# Patient Record
(demographics unavailable — no encounter records)

---

## 2025-02-03 NOTE — PHYSICAL EXAM
[de-identified] : General: Patient is a well-appearing female in no apparent distress. She is alert and oriented x 3. Vital signs are within normal limits.  No sign of fevers or infectious symptoms.   Hygiene: Excellent HEENT: Atraumatic with no asymmetry.  Neck motion is normal. No overt hearing deficits. Pulmonary: Breathing comfortably. Gastrointestinal: Is/is not obese.   Psych: Responding appropriately with appropriate affect. Patient does not demonstrate tangential thought, perseveration or anxiety. Vascular: No rashes or obvious skin abnormalities in either lower extremity. Good distal perfusion. Neurovascular: Varicose veins present/absent. 5/5 strength with hip flexion, knee extension, ankle dorsiflexion, ankle plantar flexion, and EHL. Sensation is intact over the lower extremity in L2-S1 nerve distributions. 2+ dorsalis pedis and posterior tibial pulses   [de-identified] : Gait: She walks with a Antalgic gait on the right side but no Trendelenburg   Inspection: Hip appears unremarkable. No lymphedema observed. No pitting edema observed. No ulcerations observed   Palpation: No tenderness to palpation of the greater troch   Range of Motion:   Right: HF: 110 IR: 5 ER: 25   Left: HF: 115 IR: 10 ER: 35 Both hips are stable no sign of dislocation or subluxation on exam   Knee exam is normal bilaterally. No effusions. Good pain free full ROM bilaterally, both knees are stable to varus/valgus and ant/post stress  [de-identified] : Reviewed multiple hip x-rays taken previously.  There is severe arthritis of the right hip with bone-on-bone apposition sclerosis cyst formation osteophyte formation and deformation of the femoral head.

## 2025-02-03 NOTE — CONSULT LETTER
[FreeTextEntry2] : Dr. Jacinto [FreeTextEntry1] : I had the privilege of evaluating your patient today. I have enclosed my office note for your reference. If you have any questions, concerns or further input, please do not hesitate to contact me.  Thank you for allowing me to participate in the care of your patient.  Sincerely, Jamie Celis MD

## 2025-02-03 NOTE — HISTORY OF PRESENT ILLNESS
[de-identified] : ZEFERINO BRUNNER is a pleasant 65 year female . ZEFERINO BRUNNER complains of right hip pain over the past 2 years. Pain was initially located in the groin but is now mostly in the right buttock. She denies prior injury or trauma but notes the hip pain began after she had to rely on her right leg more due to her left knee replacement in 2022. She notes the pain is worse with activity- walking/taking stairs and rates it 8 out of 10 at its worst. She still has good ROM. She does have rest/nighttime pain. She has been treated non-surgically with physical therapy, anti-inflammatory medications (Mobic-helps), activity modification, piriformis injection. She presents to discuss right BELGICA, referred by Dr. Jacinto. Denies any fevers and chills.

## 2025-02-03 NOTE — DISCUSSION/SUMMARY
[de-identified] : Severe right hip arthritis.  I had a long discussion with the patient regarding hip arthritis / degenerative disease and treatment options. We reviewed the nature and etiology of degenerative hip disease.  We discussed the spectrum of symptomatic treatments. Our discussion included the use of appropriate exercises, activity modifications, weight reduction and maintenance, appropriate medication use, use of assistive devices, role of injections and avoidance of high impact activities.  Given the clinical symptoms, physical exam and imaging findings, we discussed the possibility of hip replacement surgery.  We reviewed the elective quality of life nature of the procedure.  Think she may want to proceed in the future but at this time cannot consider surgery.  We did discuss the risk of some continued symptoms from soft tissue causes or spine pathology which is present and she understands.  However I do think she would get significant relief given the pain she has both anteriorly and in the buttock area with intermittent testing of the hip.  She is going to continue with the therapy that she is doing now as well as the other modalities we discussed and she will follow-up if she would like to discuss further or has any significant change in her condition.  Patient is agreement this plan.  All questions answered.  I, Dr. Celis, personally performed the evaluation and management (E/M) services for this patient. That E/M includes conducting the clinically appropriate initial history &/or exam, assessing all conditions, and establishing the plan of care. Today, my MARGA, Jocelin Castilloell, was here to observe my evaluation and management service for this patient & follow plan of care established by me going forward.

## 2025-07-22 NOTE — PHYSICAL EXAM
[FreeTextEntry1] :   PHYSICAL EXAM : OBJECTIVE   GENERAL : Awake ,alert and oriented to time place and person HEAD : normocephalic and atraumatic NECK : supple ,no tracheal deviation ,no thyroid enlargement noted with swallowing EYES : sclera and conjunctiva normal no redness,intact extraocular movements ENT  : ears and nose normal in appearance -hearing adequate PULMONARY: effort normal. No respiratory distress. breathing regular. No wheezes LYMPH : No swelling in limbs, capillary return within normal range CVS : warm extremities,no palpitations,not short of breath, no visible jugular venous distention PSYCH : mood and affect normal ,good eye contact ,normal attention ABDOMEN : no visible distension , NEUROLOGICAL:cranial nerves intact,muscle tone normal,gait and balance safe except where noted below SKIN : warm and dry No rash detected over specific body areas examined MUSCULOSKELETAL: normal muscle bulk, no focal bony tenderness /posture normal except where specified below  R hip 20 abduction poor rotation  weaker R leg cf to left  limping  antalgic gait  weak hip abds  tender R piriformis belly  spien FF 50

## 2025-07-22 NOTE — ASSESSMENT
[FreeTextEntry1] :   PLAN AND RECOMMENDATIONS :   We discussed differential diagnosis and clinical impression  we discussed THR  dispelled some fears myths  she is scared of sx after left knee Sx -explained hip sx much easier    Recommend .symptomatic care and support- discussed cane -she is against this   medications on meloxicam and tylenol cannot take narcotics as she is a  -high pressure performance job declined tylenol with codeine    imaging done and reviewed   referral to PT here for prehab  refer to Dr Perrin for hip steroid injection for temporary relief   hydrotherapy /heat / cold for pain  continue  precautions including care with bending, lifting, twisting and  carrying.  relative rest and avoidance of painful activity where possible  increasing activity as discussed  return for follow up.  6 weeks  The patient had the opportunity to ask questions and all were answered to their satisfaction. We will coordinate treatment with the other members of the treatment team. The patient verbalized understanding of the management/plan rationale and agreed with my recommendations.  we had a long discussion about THR and how much they have improve d I gave my own example that I had THR years ago and best medical decision I made early  limping/ living with pain/ restricting activities is very bad long term  also reached out to Dr BA to expedite visit for injection asap

## 2025-07-22 NOTE — HISTORY OF PRESENT ILLNESS
[FreeTextEntry1] :   Ms. ZEFERINO BRUNNER  is a very pleasant 65-year female who comes in for evaluation of lower back and knee that has been ongoing for 3-4 years without any specific injury or inciting event. The pain is located primarily lower back and goes towards leg and radiates to calf intermittent in nature and described as sharp. The pain is rated as 4/10 during today's visit, and ranges from 2~9/10. The patient's symptoms are aggravated by standing and alleviated by sitting, heat, pain, and medication . The patient denies any night pain, numbness/tingling, weakness, or bowel/bladder dysfunction. The patient has no other complaints at this time.   The patient has previously tried PT which did not help. she is a  she saw Dr Celis who advised THR MRI shows bone on bone OA R hip with labral tear  she had a piriformis injection which helped for a few months